# Patient Record
Sex: MALE | Race: BLACK OR AFRICAN AMERICAN | NOT HISPANIC OR LATINO | Employment: FULL TIME | RURAL
[De-identification: names, ages, dates, MRNs, and addresses within clinical notes are randomized per-mention and may not be internally consistent; named-entity substitution may affect disease eponyms.]

---

## 2020-03-08 ENCOUNTER — HISTORICAL (OUTPATIENT)
Dept: ADMINISTRATIVE | Facility: HOSPITAL | Age: 65
End: 2020-03-08

## 2020-03-08 LAB
BASOPHILS # BLD AUTO: 0.05 X10E3/UL (ref 0–0.2)
BASOPHILS NFR BLD AUTO: 0.4 % (ref 0–1)
BUN SERPL-MCNC: 17 MG/DL (ref 7–18)
CALCIUM SERPL-MCNC: 9.5 MG/DL (ref 8.5–10.1)
CHLORIDE SERPL-SCNC: 99 MMOL/L (ref 98–107)
CO2 SERPL-SCNC: 29 MMOL/L (ref 21–32)
CREAT SERPL-MCNC: 1.37 MG/DL (ref 0.7–1.3)
EOSINOPHIL # BLD AUTO: 0.1 X10E3/UL (ref 0–0.5)
EOSINOPHIL NFR BLD AUTO: 0.9 % (ref 1–4)
ERYTHROCYTE [DISTWIDTH] IN BLOOD BY AUTOMATED COUNT: 11.9 % (ref 11.5–14.5)
GLUCOSE SERPL-MCNC: 203 MG/DL (ref 74–106)
GLUCOSE SERPL-MCNC: 238 MG/DL (ref 70–105)
HCT VFR BLD AUTO: 51.3 % (ref 40–54)
HGB BLD-MCNC: 16 G/DL (ref 13.5–18)
IMM GRANULOCYTES # BLD AUTO: 0.06 X10E3/UL (ref 0–0.04)
IMM GRANULOCYTES NFR BLD: 0.5 % (ref 0–0.4)
LYMPHOCYTES # BLD AUTO: 1.48 X10E3/UL (ref 1–4.8)
LYMPHOCYTES NFR BLD AUTO: 13.1 % (ref 27–41)
MCH RBC QN AUTO: 31.1 PG (ref 27–31)
MCHC RBC AUTO-ENTMCNC: 31.2 G/DL (ref 32–36)
MCV RBC AUTO: 99.6 FL (ref 80–96)
MONOCYTES # BLD AUTO: 0.96 X10E3/UL (ref 0–0.8)
MONOCYTES NFR BLD AUTO: 8.5 % (ref 2–6)
MPC BLD CALC-MCNC: 10.5 FL (ref 9.4–12.4)
NEUTROPHILS # BLD AUTO: 8.63 X10E3/UL (ref 1.8–7.7)
NEUTROPHILS NFR BLD AUTO: 76.6 % (ref 53–65)
NRBC # BLD AUTO: 0 X10E3/UL (ref 0–0)
NRBC, AUTO (.00): 0 /100 (ref 0–0)
PLATELET # BLD AUTO: 256 X10E3/UL (ref 150–400)
POTASSIUM SERPL-SCNC: 4.4 MMOL/L (ref 3.5–5.1)
RBC # BLD AUTO: 5.15 X10E6/UL (ref 4.6–6.2)
SODIUM SERPL-SCNC: 136 MMOL/L (ref 136–145)
WBC # BLD AUTO: 11.28 X10E3/UL (ref 4.5–11)

## 2020-03-13 ENCOUNTER — HISTORICAL (OUTPATIENT)
Dept: ADMINISTRATIVE | Facility: HOSPITAL | Age: 65
End: 2020-03-13

## 2020-03-13 LAB
BASOPHILS # BLD AUTO: 0.04 X10E3/UL (ref 0–0.2)
BASOPHILS NFR BLD AUTO: 0.4 % (ref 0–1)
BUN SERPL-MCNC: 20 MG/DL (ref 7–18)
CALCIUM SERPL-MCNC: 9.1 MG/DL (ref 8.5–10.1)
CHLORIDE SERPL-SCNC: 93 MMOL/L (ref 98–107)
CO2 SERPL-SCNC: 26 MMOL/L (ref 21–32)
CREAT SERPL-MCNC: 1.66 MG/DL (ref 0.7–1.3)
EOSINOPHIL # BLD AUTO: 0.01 X10E3/UL (ref 0–0.5)
EOSINOPHIL NFR BLD AUTO: 0.1 % (ref 1–4)
ERYTHROCYTE [DISTWIDTH] IN BLOOD BY AUTOMATED COUNT: 11.9 % (ref 11.5–14.5)
GLUCOSE SERPL-MCNC: 265 MG/DL (ref 74–106)
HCT VFR BLD AUTO: 47.9 % (ref 40–54)
HGB BLD-MCNC: 15.3 G/DL (ref 13.5–18)
IMM GRANULOCYTES # BLD AUTO: 0.08 X10E3/UL (ref 0–0.04)
IMM GRANULOCYTES NFR BLD: 0.8 % (ref 0–0.4)
LYMPHOCYTES # BLD AUTO: 0.64 X10E3/UL (ref 1–4.8)
LYMPHOCYTES NFR BLD AUTO: 6.8 % (ref 27–41)
LYMPHOCYTES NFR BLD MANUAL: 12 % (ref 27–41)
MCH RBC QN AUTO: 30.9 PG (ref 27–31)
MCHC RBC AUTO-ENTMCNC: 31.9 G/DL (ref 32–36)
MCV RBC AUTO: 96.8 FL (ref 80–96)
METAMYELOCYTES NFR BLD MANUAL: 1 %
MONOCYTES # BLD AUTO: 0.75 X10E3/UL (ref 0–0.8)
MONOCYTES NFR BLD AUTO: 7.9 % (ref 2–6)
MONOCYTES NFR BLD MANUAL: 7 % (ref 2–6)
MPC BLD CALC-MCNC: 10 FL (ref 9.4–12.4)
MYELOCYTES NFR BLD MANUAL: 1 %
NEUTROPHILS # BLD AUTO: 7.92 X10E3/UL (ref 1.8–7.7)
NEUTROPHILS NFR BLD AUTO: 84 % (ref 53–65)
NEUTS SEG NFR BLD MANUAL: 79 % (ref 50–62)
NRBC # BLD AUTO: 0 X10E3/UL (ref 0–0)
NRBC, AUTO (.00): 0 /100 (ref 0–0)
PLATELET # BLD AUTO: 239 X10E3/UL (ref 150–400)
PLATELET MORPHOLOGY: NORMAL
POTASSIUM SERPL-SCNC: 4.7 MMOL/L (ref 3.5–5.1)
RBC # BLD AUTO: 4.95 X10E6/UL (ref 4.6–6.2)
RBC MORPH BLD: NORMAL
SODIUM SERPL-SCNC: 129 MMOL/L (ref 136–145)
TOXIC GRANULES BLD QL SMEAR: ABNORMAL
WBC # BLD AUTO: 9.44 X10E3/UL (ref 4.5–11)
WBC TOXIC VACUOLES BLD QL SMEAR: ABNORMAL

## 2020-03-14 LAB
REPORT: NORMAL

## 2020-03-21 ENCOUNTER — HISTORICAL (OUTPATIENT)
Dept: ADMINISTRATIVE | Facility: HOSPITAL | Age: 65
End: 2020-03-21

## 2020-03-21 LAB
ALBUMIN SERPL BCP-MCNC: 2.7 G/DL (ref 3.5–5)
ALBUMIN/GLOB SERPL: 0.5 {RATIO}
ALP SERPL-CCNC: 93 U/L (ref 45–115)
ALT SERPL W P-5'-P-CCNC: 24 U/L (ref 16–61)
AST SERPL W P-5'-P-CCNC: 11 U/L (ref 15–37)
BASOPHILS # BLD AUTO: 0.04 X10E3/UL (ref 0–0.2)
BASOPHILS NFR BLD AUTO: 0.3 % (ref 0–1)
BILIRUB SERPL-MCNC: 0.9 MG/DL (ref 0–1.2)
BUN SERPL-MCNC: 15 MG/DL (ref 7–18)
BUN/CREAT SERPL: 11.5
CALCIUM SERPL-MCNC: 9.3 MG/DL (ref 8.5–10.1)
CHLORIDE SERPL-SCNC: 96 MMOL/L (ref 98–107)
CO2 SERPL-SCNC: 30 MMOL/L (ref 21–32)
CREAT SERPL-MCNC: 1.31 MG/DL (ref 0.7–1.3)
EOSINOPHIL # BLD AUTO: 0.03 X10E3/UL (ref 0–0.5)
EOSINOPHIL NFR BLD AUTO: 0.2 % (ref 1–4)
ERYTHROCYTE [DISTWIDTH] IN BLOOD BY AUTOMATED COUNT: 11.8 % (ref 11.5–14.5)
GLOBULIN SER-MCNC: 5 G/DL (ref 2–4)
GLUCOSE SERPL-MCNC: 149 MG/DL (ref 74–106)
GLUCOSE SERPL-MCNC: 206 MG/DL (ref 70–105)
HCT VFR BLD AUTO: 41.3 % (ref 40–54)
HGB BLD-MCNC: 12.8 G/DL (ref 13.5–18)
IMM GRANULOCYTES # BLD AUTO: 0.17 X10E3/UL (ref 0–0.04)
IMM GRANULOCYTES NFR BLD: 1.1 % (ref 0–0.4)
LACTATE SERPL-SCNC: 1.5 MMOL/L (ref 0.4–2)
LYMPHOCYTES # BLD AUTO: 2.17 X10E3/UL (ref 1–4.8)
LYMPHOCYTES NFR BLD AUTO: 14.4 % (ref 27–41)
MCH RBC QN AUTO: 30.3 PG (ref 27–31)
MCHC RBC AUTO-ENTMCNC: 31 G/DL (ref 32–36)
MCV RBC AUTO: 97.6 FL (ref 80–96)
MONOCYTES # BLD AUTO: 1.35 X10E3/UL (ref 0–0.8)
MONOCYTES NFR BLD AUTO: 9 % (ref 2–6)
MPC BLD CALC-MCNC: 9.6 FL (ref 9.4–12.4)
NEUTROPHILS # BLD AUTO: 11.27 X10E3/UL (ref 1.8–7.7)
NEUTROPHILS NFR BLD AUTO: 75 % (ref 53–65)
NRBC # BLD AUTO: 0 X10E3/UL (ref 0–0)
NRBC, AUTO (.00): 0 /100 (ref 0–0)
PLATELET # BLD AUTO: 479 X10E3/UL (ref 150–400)
POTASSIUM SERPL-SCNC: 4.6 MMOL/L (ref 3.5–5.1)
PROT SERPL-MCNC: 7.7 G/DL (ref 6.4–8.2)
RBC # BLD AUTO: 4.23 X10E6/UL (ref 4.6–6.2)
SODIUM SERPL-SCNC: 132 MMOL/L (ref 136–145)
WBC # BLD AUTO: 15.03 X10E3/UL (ref 4.5–11)

## 2020-03-22 ENCOUNTER — HISTORICAL (OUTPATIENT)
Dept: ADMINISTRATIVE | Facility: HOSPITAL | Age: 65
End: 2020-03-22

## 2020-03-22 LAB
GLUCOSE SERPL-MCNC: 174 MG/DL (ref 70–105)
GLUCOSE SERPL-MCNC: 218 MG/DL (ref 70–105)
GLUCOSE SERPL-MCNC: 250 MG/DL (ref 70–105)

## 2020-03-23 ENCOUNTER — HISTORICAL (OUTPATIENT)
Dept: ADMINISTRATIVE | Facility: HOSPITAL | Age: 65
End: 2020-03-23

## 2020-03-23 LAB
BASOPHILS # BLD AUTO: 0.04 X10E3/UL (ref 0–0.2)
BASOPHILS NFR BLD AUTO: 0.3 % (ref 0–1)
BUN SERPL-MCNC: 9 MG/DL (ref 7–18)
CALCIUM SERPL-MCNC: 9 MG/DL (ref 8.5–10.1)
CHLORIDE SERPL-SCNC: 101 MMOL/L (ref 98–107)
CO2 SERPL-SCNC: 28 MMOL/L (ref 21–32)
CREAT SERPL-MCNC: 1.35 MG/DL (ref 0.7–1.3)
EOSINOPHIL # BLD AUTO: 0.05 X10E3/UL (ref 0–0.5)
EOSINOPHIL NFR BLD AUTO: 0.4 % (ref 1–4)
ERYTHROCYTE [DISTWIDTH] IN BLOOD BY AUTOMATED COUNT: 11.7 % (ref 11.5–14.5)
GLUCOSE SERPL-MCNC: 109 MG/DL (ref 70–105)
GLUCOSE SERPL-MCNC: 115 MG/DL (ref 74–106)
GLUCOSE SERPL-MCNC: 128 MG/DL (ref 70–105)
GLUCOSE SERPL-MCNC: 181 MG/DL (ref 70–105)
GLUCOSE SERPL-MCNC: 231 MG/DL (ref 70–105)
HCT VFR BLD AUTO: 36.6 % (ref 40–54)
HGB BLD-MCNC: 11.3 G/DL (ref 13.5–18)
IMM GRANULOCYTES # BLD AUTO: 0.1 X10E3/UL (ref 0–0.04)
IMM GRANULOCYTES NFR BLD: 0.8 % (ref 0–0.4)
LYMPHOCYTES # BLD AUTO: 2.16 X10E3/UL (ref 1–4.8)
LYMPHOCYTES NFR BLD AUTO: 17.6 % (ref 27–41)
MCH RBC QN AUTO: 30.5 PG (ref 27–31)
MCHC RBC AUTO-ENTMCNC: 30.9 G/DL (ref 32–36)
MCV RBC AUTO: 98.9 FL (ref 80–96)
MONOCYTES # BLD AUTO: 1.22 X10E3/UL (ref 0–0.8)
MONOCYTES NFR BLD AUTO: 9.9 % (ref 2–6)
MPC BLD CALC-MCNC: 9.7 FL (ref 9.4–12.4)
NEUTROPHILS # BLD AUTO: 8.7 X10E3/UL (ref 1.8–7.7)
NEUTROPHILS NFR BLD AUTO: 71 % (ref 53–65)
NRBC # BLD AUTO: 0 X10E3/UL (ref 0–0)
NRBC, AUTO (.00): 0 /100 (ref 0–0)
PLATELET # BLD AUTO: 411 X10E3/UL (ref 150–400)
POTASSIUM SERPL-SCNC: 4.3 MMOL/L (ref 3.5–5.1)
RBC # BLD AUTO: 3.7 X10E6/UL (ref 4.6–6.2)
SODIUM SERPL-SCNC: 136 MMOL/L (ref 136–145)
WBC # BLD AUTO: 12.27 X10E3/UL (ref 4.5–11)

## 2020-03-24 ENCOUNTER — HISTORICAL (OUTPATIENT)
Dept: ADMINISTRATIVE | Facility: HOSPITAL | Age: 65
End: 2020-03-24

## 2020-03-24 LAB
ABO: NORMAL
ANTIBODY SCREEN: NORMAL
BASOPHILS # BLD AUTO: 0.04 X10E3/UL (ref 0–0.2)
BASOPHILS NFR BLD AUTO: 0.3 % (ref 0–1)
BUN SERPL-MCNC: 10 MG/DL (ref 7–18)
C COLI+JEJ+UPSA DNA STL QL NAA+NON-PROBE: NEGATIVE
CALCIUM SERPL-MCNC: 9.7 MG/DL (ref 8.5–10.1)
CHLORIDE SERPL-SCNC: 99 MMOL/L (ref 98–107)
CO2 SERPL-SCNC: 25 MMOL/L (ref 21–32)
CREAT SERPL-MCNC: 1.22 MG/DL (ref 0.7–1.3)
E COLI SXT1 STL QL IA: NEGATIVE
E COLI SXT2 STL QL IA: NEGATIVE
EOSINOPHIL # BLD AUTO: 0.03 X10E3/UL (ref 0–0.5)
EOSINOPHIL NFR BLD AUTO: 0.3 % (ref 1–4)
ERYTHROCYTE [DISTWIDTH] IN BLOOD BY AUTOMATED COUNT: 11.9 % (ref 11.5–14.5)
GLUCOSE SERPL-MCNC: 116 MG/DL (ref 70–105)
GLUCOSE SERPL-MCNC: 135 MG/DL (ref 70–105)
GLUCOSE SERPL-MCNC: 139 MG/DL (ref 74–106)
GLUCOSE SERPL-MCNC: 141 MG/DL (ref 70–105)
GLUCOSE SERPL-MCNC: 148 MG/DL (ref 70–105)
GLUCOSE SERPL-MCNC: 213 MG/DL (ref 70–105)
HCT VFR BLD AUTO: 37.4 % (ref 40–54)
HGB BLD-MCNC: 11.9 G/DL (ref 13.5–18)
IMM GRANULOCYTES # BLD AUTO: 0.08 X10E3/UL (ref 0–0.04)
IMM GRANULOCYTES NFR BLD: 0.7 % (ref 0–0.4)
LYMPHOCYTES # BLD AUTO: 2.14 X10E3/UL (ref 1–4.8)
LYMPHOCYTES NFR BLD AUTO: 18.2 % (ref 27–41)
MCH RBC QN AUTO: 30.7 PG (ref 27–31)
MCHC RBC AUTO-ENTMCNC: 31.8 G/DL (ref 32–36)
MCV RBC AUTO: 96.4 FL (ref 80–96)
MONOCYTES # BLD AUTO: 1.1 X10E3/UL (ref 0–0.8)
MONOCYTES NFR BLD AUTO: 9.4 % (ref 2–6)
MPC BLD CALC-MCNC: 9.7 FL (ref 9.4–12.4)
NEUTROPHILS # BLD AUTO: 8.35 X10E3/UL (ref 1.8–7.7)
NEUTROPHILS NFR BLD AUTO: 71.1 % (ref 53–65)
NOROVIRUS GI+II RNA STL QL NAA+NON-PROBE: NEGATIVE
NRBC # BLD AUTO: 0 X10E3/UL (ref 0–0)
NRBC, AUTO (.00): 0 /100 (ref 0–0)
PLATELET # BLD AUTO: 418 X10E3/UL (ref 150–400)
POTASSIUM SERPL-SCNC: 4.1 MMOL/L (ref 3.5–5.1)
RBC # BLD AUTO: 3.88 X10E6/UL (ref 4.6–6.2)
RH TYPE: NORMAL
RVA RNA STL QL NAA+NON-PROBE: NEGATIVE
S ENT+BONG DNA STL QL NAA+NON-PROBE: NEGATIVE
SHIGELLA SPECIES NAT: NEGATIVE
SODIUM SERPL-SCNC: 133 MMOL/L (ref 136–145)
V CHOL+PARA+VUL DNA STL QL NAA+NON-PROBE: NEGATIVE
WBC # BLD AUTO: 11.74 X10E3/UL (ref 4.5–11)
Y ENTEROCOL DNA STL QL NAA+NON-PROBE: NEGATIVE

## 2020-03-25 ENCOUNTER — HISTORICAL (OUTPATIENT)
Dept: ADMINISTRATIVE | Facility: HOSPITAL | Age: 65
End: 2020-03-25

## 2020-03-25 LAB
BUN SERPL-MCNC: 11 MG/DL (ref 7–18)
C DIFF TOX A+B STL IA-ACNC: NEGATIVE
CALCIUM SERPL-MCNC: 9.2 MG/DL (ref 8.5–10.1)
CHLORIDE SERPL-SCNC: 101 MMOL/L (ref 98–107)
CO2 SERPL-SCNC: 25 MMOL/L (ref 21–32)
CREAT SERPL-MCNC: 1.19 MG/DL (ref 0.7–1.3)
GLUCOSE SERPL-MCNC: 115 MG/DL (ref 70–105)
GLUCOSE SERPL-MCNC: 143 MG/DL (ref 74–106)
GLUCOSE SERPL-MCNC: 144 MG/DL (ref 70–105)
GLUCOSE SERPL-MCNC: 178 MG/DL (ref 70–105)
GLUCOSE SERPL-MCNC: 210 MG/DL (ref 70–105)
HCT VFR BLD AUTO: 36.8 % (ref 40–54)
POTASSIUM SERPL-SCNC: 4.3 MMOL/L (ref 3.5–5.1)
REPORT: 38
REPORT: NORMAL
SODIUM SERPL-SCNC: 133 MMOL/L (ref 136–145)

## 2020-03-26 ENCOUNTER — HISTORICAL (OUTPATIENT)
Dept: ADMINISTRATIVE | Facility: HOSPITAL | Age: 65
End: 2020-03-26

## 2020-03-26 LAB
GLUCOSE SERPL-MCNC: 148 MG/DL (ref 70–105)
GLUCOSE SERPL-MCNC: 153 MG/DL (ref 70–105)
GLUCOSE SERPL-MCNC: 155 MG/DL (ref 70–105)
GLUCOSE SERPL-MCNC: 163 MG/DL (ref 70–105)
INSULIN SERPL-ACNC: NORMAL U[IU]/ML
INSULIN SERPL-ACNC: YELLOW U[IU]/ML
LAB AP CLINICAL INFORMATION: NORMAL
LAB AP DIAGNOSIS - HISTORICAL: NORMAL
LAB AP GROSS PATHOLOGY - HISTORICAL: NORMAL
LAB AP SPECIMEN SUBMITTED - HISTORICAL: NORMAL
REPORT: NORMAL

## 2020-03-27 ENCOUNTER — HISTORICAL (OUTPATIENT)
Dept: ADMINISTRATIVE | Facility: HOSPITAL | Age: 65
End: 2020-03-27

## 2020-03-27 LAB
ABO: NORMAL
ANTIBODY SCREEN: NORMAL
BASOPHILS # BLD AUTO: 0.04 X10E3/UL (ref 0–0.2)
BASOPHILS NFR BLD AUTO: 0.4 % (ref 0–1)
BUN SERPL-MCNC: 12 MG/DL (ref 7–18)
CALCIUM SERPL-MCNC: 8.8 MG/DL (ref 8.5–10.1)
CHLORIDE SERPL-SCNC: 102 MMOL/L (ref 98–107)
CO2 SERPL-SCNC: 26 MMOL/L (ref 21–32)
CREAT SERPL-MCNC: 1.15 MG/DL (ref 0.7–1.3)
EOSINOPHIL # BLD AUTO: 0.16 X10E3/UL (ref 0–0.5)
EOSINOPHIL NFR BLD AUTO: 1.5 % (ref 1–4)
ERYTHROCYTE [DISTWIDTH] IN BLOOD BY AUTOMATED COUNT: 11.8 % (ref 11.5–14.5)
GLUCOSE SERPL-MCNC: 116 MG/DL (ref 70–105)
GLUCOSE SERPL-MCNC: 135 MG/DL (ref 74–106)
GLUCOSE SERPL-MCNC: 141 MG/DL (ref 70–105)
GLUCOSE SERPL-MCNC: 246 MG/DL (ref 70–105)
HCT VFR BLD AUTO: 32.1 % (ref 40–54)
HGB BLD-MCNC: 10.2 G/DL (ref 13.5–18)
IMM GRANULOCYTES # BLD AUTO: 0.05 X10E3/UL (ref 0–0.04)
IMM GRANULOCYTES NFR BLD: 0.5 % (ref 0–0.4)
LYMPHOCYTES # BLD AUTO: 2.01 X10E3/UL (ref 1–4.8)
LYMPHOCYTES NFR BLD AUTO: 19.3 % (ref 27–41)
MCH RBC QN AUTO: 30.9 PG (ref 27–31)
MCHC RBC AUTO-ENTMCNC: 31.8 G/DL (ref 32–36)
MCV RBC AUTO: 97.3 FL (ref 80–96)
MONOCYTES # BLD AUTO: 0.95 X10E3/UL (ref 0–0.8)
MONOCYTES NFR BLD AUTO: 9.1 % (ref 2–6)
MPC BLD CALC-MCNC: 9.7 FL (ref 9.4–12.4)
NEUTROPHILS # BLD AUTO: 7.19 X10E3/UL (ref 1.8–7.7)
NEUTROPHILS NFR BLD AUTO: 69.2 % (ref 53–65)
NRBC # BLD AUTO: 0 X10E3/UL (ref 0–0)
NRBC, AUTO (.00): 0 /100 (ref 0–0)
PLATELET # BLD AUTO: 367 X10E3/UL (ref 150–400)
POTASSIUM SERPL-SCNC: 4 MMOL/L (ref 3.5–5.1)
RBC # BLD AUTO: 3.3 X10E6/UL (ref 4.6–6.2)
REPORT: NORMAL
RH TYPE: NORMAL
SODIUM SERPL-SCNC: 136 MMOL/L (ref 136–145)
WBC # BLD AUTO: 10.4 X10E3/UL (ref 4.5–11)

## 2020-03-28 ENCOUNTER — HISTORICAL (OUTPATIENT)
Dept: ADMINISTRATIVE | Facility: HOSPITAL | Age: 65
End: 2020-03-28

## 2020-03-28 LAB
CHOLEST SERPL-MCNC: 102 MG/DL
GLUCOSE SERPL-MCNC: 125 MG/DL (ref 70–105)
GLUCOSE SERPL-MCNC: 140 MG/DL (ref 70–105)
GLUCOSE SERPL-MCNC: 182 MG/DL (ref 70–105)
GLUCOSE SERPL-MCNC: 244 MG/DL (ref 70–105)
HCT VFR BLD AUTO: 33.4 % (ref 40–54)
HDLC SERPL-MCNC: 30 MG/DL
LDLC SERPL CALC-MCNC: 55 MG/DL
TRIGL SERPL-MCNC: 83 MG/DL
VLDLC SERPL-MCNC: 17 MG/DL

## 2020-03-29 ENCOUNTER — HISTORICAL (OUTPATIENT)
Dept: ADMINISTRATIVE | Facility: HOSPITAL | Age: 65
End: 2020-03-29

## 2020-03-29 LAB
GLUCOSE SERPL-MCNC: 141 MG/DL (ref 70–105)
GLUCOSE SERPL-MCNC: 149 MG/DL (ref 70–105)
GLUCOSE SERPL-MCNC: 155 MG/DL (ref 70–105)
GLUCOSE SERPL-MCNC: 222 MG/DL (ref 70–105)

## 2020-03-30 ENCOUNTER — HISTORICAL (OUTPATIENT)
Dept: ADMINISTRATIVE | Facility: HOSPITAL | Age: 65
End: 2020-03-30

## 2020-03-30 LAB
GLUCOSE SERPL-MCNC: 108 MG/DL (ref 70–105)
GLUCOSE SERPL-MCNC: 131 MG/DL (ref 70–105)
GLUCOSE SERPL-MCNC: 133 MG/DL (ref 70–105)
GLUCOSE SERPL-MCNC: 133 MG/DL (ref 70–105)
GLUCOSE SERPL-MCNC: 166 MG/DL (ref 70–105)

## 2020-03-31 ENCOUNTER — HISTORICAL (OUTPATIENT)
Dept: ADMINISTRATIVE | Facility: HOSPITAL | Age: 65
End: 2020-03-31

## 2020-03-31 LAB
GLUCOSE SERPL-MCNC: 125 MG/DL (ref 70–105)
GLUCOSE SERPL-MCNC: 151 MG/DL (ref 70–105)
GLUCOSE SERPL-MCNC: 168 MG/DL (ref 70–105)
GLUCOSE SERPL-MCNC: 203 MG/DL (ref 70–105)

## 2020-04-01 ENCOUNTER — HISTORICAL (OUTPATIENT)
Dept: ADMINISTRATIVE | Facility: HOSPITAL | Age: 65
End: 2020-04-01

## 2020-04-01 LAB
BASOPHILS # BLD AUTO: 0.03 X10E3/UL (ref 0–0.2)
BASOPHILS NFR BLD AUTO: 0.3 % (ref 0–1)
BUN SERPL-MCNC: 8 MG/DL (ref 7–18)
CALCIUM SERPL-MCNC: 8.3 MG/DL (ref 8.5–10.1)
CHLORIDE SERPL-SCNC: 103 MMOL/L (ref 98–107)
CO2 SERPL-SCNC: 26 MMOL/L (ref 21–32)
CREAT SERPL-MCNC: 0.91 MG/DL (ref 0.7–1.3)
EOSINOPHIL # BLD AUTO: 0.11 X10E3/UL (ref 0–0.5)
EOSINOPHIL NFR BLD AUTO: 1.3 % (ref 1–4)
ERYTHROCYTE [DISTWIDTH] IN BLOOD BY AUTOMATED COUNT: 12.4 % (ref 11.5–14.5)
GLUCOSE SERPL-MCNC: 107 MG/DL (ref 70–105)
GLUCOSE SERPL-MCNC: 128 MG/DL (ref 74–106)
GLUCOSE SERPL-MCNC: 146 MG/DL (ref 70–105)
GLUCOSE SERPL-MCNC: 177 MG/DL (ref 70–105)
GLUCOSE SERPL-MCNC: 242 MG/DL (ref 70–105)
GLUCOSE SERPL-MCNC: 261 MG/DL (ref 70–105)
HCT VFR BLD AUTO: 28.4 % (ref 40–54)
HGB BLD-MCNC: 8.6 G/DL (ref 13.5–18)
IMM GRANULOCYTES # BLD AUTO: 0.05 X10E3/UL (ref 0–0.04)
IMM GRANULOCYTES NFR BLD: 0.6 % (ref 0–0.4)
LYMPHOCYTES # BLD AUTO: 1.64 X10E3/UL (ref 1–4.8)
LYMPHOCYTES NFR BLD AUTO: 18.8 % (ref 27–41)
MCH RBC QN AUTO: 30.5 PG (ref 27–31)
MCHC RBC AUTO-ENTMCNC: 30.3 G/DL (ref 32–36)
MCV RBC AUTO: 100.7 FL (ref 80–96)
MONOCYTES # BLD AUTO: 0.96 X10E3/UL (ref 0–0.8)
MONOCYTES NFR BLD AUTO: 11 % (ref 2–6)
MPC BLD CALC-MCNC: 10.1 FL (ref 9.4–12.4)
NEUTROPHILS # BLD AUTO: 5.92 X10E3/UL (ref 1.8–7.7)
NEUTROPHILS NFR BLD AUTO: 68 % (ref 53–65)
NRBC # BLD AUTO: 0 X10E3/UL (ref 0–0)
NRBC, AUTO (.00): 0 /100 (ref 0–0)
PLATELET # BLD AUTO: 310 X10E3/UL (ref 150–400)
POTASSIUM SERPL-SCNC: 4.1 MMOL/L (ref 3.5–5.1)
RBC # BLD AUTO: 2.82 X10E6/UL (ref 4.6–6.2)
SODIUM SERPL-SCNC: 137 MMOL/L (ref 136–145)
WBC # BLD AUTO: 8.71 X10E3/UL (ref 4.5–11)

## 2020-04-02 ENCOUNTER — HISTORICAL (OUTPATIENT)
Dept: ADMINISTRATIVE | Facility: HOSPITAL | Age: 65
End: 2020-04-02

## 2020-04-02 LAB
GLUCOSE SERPL-MCNC: 141 MG/DL (ref 70–105)
GLUCOSE SERPL-MCNC: 178 MG/DL (ref 70–105)
GLUCOSE SERPL-MCNC: 181 MG/DL (ref 70–105)
GLUCOSE SERPL-MCNC: 182 MG/DL (ref 70–105)
GLUCOSE SERPL-MCNC: 189 MG/DL (ref 70–105)

## 2020-04-03 ENCOUNTER — HISTORICAL (OUTPATIENT)
Dept: ADMINISTRATIVE | Facility: HOSPITAL | Age: 65
End: 2020-04-03

## 2020-04-03 LAB
GLUCOSE SERPL-MCNC: 129 MG/DL (ref 70–105)
GLUCOSE SERPL-MCNC: 131 MG/DL (ref 70–105)
GLUCOSE SERPL-MCNC: 137 MG/DL (ref 70–105)
GLUCOSE SERPL-MCNC: 139 MG/DL (ref 70–105)
GLUCOSE SERPL-MCNC: 182 MG/DL (ref 70–105)
REPORT: NORMAL

## 2020-04-04 ENCOUNTER — HISTORICAL (OUTPATIENT)
Dept: ADMINISTRATIVE | Facility: HOSPITAL | Age: 65
End: 2020-04-04

## 2020-04-04 LAB
GLUCOSE SERPL-MCNC: 120 MG/DL (ref 70–105)
GLUCOSE SERPL-MCNC: 159 MG/DL (ref 70–105)
GLUCOSE SERPL-MCNC: 166 MG/DL (ref 70–105)
GLUCOSE SERPL-MCNC: 233 MG/DL (ref 70–105)
REPORT: NORMAL

## 2020-04-05 ENCOUNTER — HISTORICAL (OUTPATIENT)
Dept: ADMINISTRATIVE | Facility: HOSPITAL | Age: 65
End: 2020-04-05

## 2020-04-05 LAB
C DIFF TOX A+B STL IA-ACNC: NEGATIVE
GLUCOSE SERPL-MCNC: 140 MG/DL (ref 70–105)
GLUCOSE SERPL-MCNC: 148 MG/DL (ref 70–105)

## 2020-04-08 LAB

## 2020-04-17 ENCOUNTER — HISTORICAL (OUTPATIENT)
Dept: ADMINISTRATIVE | Facility: HOSPITAL | Age: 65
End: 2020-04-17

## 2020-05-06 ENCOUNTER — HISTORICAL (OUTPATIENT)
Dept: ADMINISTRATIVE | Facility: HOSPITAL | Age: 65
End: 2020-05-06

## 2020-05-06 LAB
BASOPHILS # BLD AUTO: 0.03 X10E3/UL (ref 0–0.2)
BASOPHILS NFR BLD AUTO: 0.4 % (ref 0–1)
EOSINOPHIL # BLD AUTO: 0.13 X10E3/UL (ref 0–0.5)
EOSINOPHIL NFR BLD AUTO: 1.6 % (ref 1–4)
ERYTHROCYTE [DISTWIDTH] IN BLOOD BY AUTOMATED COUNT: 14.3 % (ref 11.5–14.5)
GLUCOSE SERPL-MCNC: 110 MG/DL (ref 70–105)
GLUCOSE SERPL-MCNC: 179 MG/DL (ref 70–105)
HCT VFR BLD AUTO: 43.1 % (ref 40–54)
HGB BLD-MCNC: 13 G/DL (ref 13.5–18)
IMM GRANULOCYTES # BLD AUTO: 0.03 X10E3/UL (ref 0–0.04)
IMM GRANULOCYTES NFR BLD: 0.4 % (ref 0–0.4)
LYMPHOCYTES # BLD AUTO: 1.82 X10E3/UL (ref 1–4.8)
LYMPHOCYTES NFR BLD AUTO: 21.7 % (ref 27–41)
MCH RBC QN AUTO: 30.8 PG (ref 27–31)
MCHC RBC AUTO-ENTMCNC: 30.2 G/DL (ref 32–36)
MCV RBC AUTO: 102.1 FL (ref 80–96)
MONOCYTES # BLD AUTO: 0.85 X10E3/UL (ref 0–0.8)
MONOCYTES NFR BLD AUTO: 10.2 % (ref 2–6)
MPC BLD CALC-MCNC: 10.2 FL (ref 9.4–12.4)
NEUTROPHILS # BLD AUTO: 5.51 X10E3/UL (ref 1.8–7.7)
NEUTROPHILS NFR BLD AUTO: 65.7 % (ref 53–65)
NRBC # BLD AUTO: 0 X10E3/UL (ref 0–0)
NRBC, AUTO (.00): 0 /100 (ref 0–0)
PLATELET # BLD AUTO: 205 X10E3/UL (ref 150–400)
RBC # BLD AUTO: 4.22 X10E6/UL (ref 4.6–6.2)
WBC # BLD AUTO: 8.37 X10E3/UL (ref 4.5–11)

## 2020-05-09 LAB
REPORT: NORMAL

## 2020-05-11 LAB
REPORT: NORMAL

## 2020-08-18 ENCOUNTER — HISTORICAL (OUTPATIENT)
Dept: ADMINISTRATIVE | Facility: HOSPITAL | Age: 65
End: 2020-08-18

## 2020-09-02 ENCOUNTER — HISTORICAL (OUTPATIENT)
Dept: ADMINISTRATIVE | Facility: HOSPITAL | Age: 65
End: 2020-09-02

## 2021-04-08 DIAGNOSIS — Z12.11 SCREEN FOR COLON CANCER: Primary | ICD-10-CM

## 2021-05-04 ENCOUNTER — OFFICE VISIT (OUTPATIENT)
Dept: SURGERY | Facility: CLINIC | Age: 66
End: 2021-05-04
Payer: OTHER GOVERNMENT

## 2021-05-04 VITALS — BODY MASS INDEX: 27.97 KG/M2 | WEIGHT: 152 LBS | HEIGHT: 62 IN

## 2021-05-04 DIAGNOSIS — S98.111A AMPUTATION OF RIGHT GREAT TOE: ICD-10-CM

## 2021-05-04 PROCEDURE — 99213 HC OFFICE/OUTPT VISIT, EST, LEVL III, 20-29 MIN: ICD-10-PCS | Mod: PBBFAC,,, | Performed by: STUDENT IN AN ORGANIZED HEALTH CARE EDUCATION/TRAINING PROGRAM

## 2021-05-04 PROCEDURE — 99214 PR OFFICE/OUTPT VISIT, EST, LEVL IV, 30-39 MIN: ICD-10-PCS | Mod: S$PBB,,, | Performed by: STUDENT IN AN ORGANIZED HEALTH CARE EDUCATION/TRAINING PROGRAM

## 2021-05-04 PROCEDURE — 99213 OFFICE O/P EST LOW 20 MIN: CPT | Mod: PBBFAC,,, | Performed by: STUDENT IN AN ORGANIZED HEALTH CARE EDUCATION/TRAINING PROGRAM

## 2021-05-04 PROCEDURE — 99214 OFFICE O/P EST MOD 30 MIN: CPT | Mod: S$PBB,,, | Performed by: STUDENT IN AN ORGANIZED HEALTH CARE EDUCATION/TRAINING PROGRAM

## 2021-05-04 PROCEDURE — 99213 OFFICE O/P EST LOW 20 MIN: CPT | Mod: PBBFAC | Performed by: STUDENT IN AN ORGANIZED HEALTH CARE EDUCATION/TRAINING PROGRAM

## 2021-05-04 RX ORDER — METFORMIN HYDROCHLORIDE 500 MG/1
500 TABLET ORAL 2 TIMES DAILY WITH MEALS
COMMUNITY

## 2021-05-08 ENCOUNTER — HOSPITAL ENCOUNTER (EMERGENCY)
Facility: HOSPITAL | Age: 66
Discharge: HOME | End: 2021-05-08
Payer: OTHER GOVERNMENT

## 2021-05-08 VITALS
HEART RATE: 88 BPM | BODY MASS INDEX: 28.16 KG/M2 | OXYGEN SATURATION: 98 % | DIASTOLIC BLOOD PRESSURE: 85 MMHG | SYSTOLIC BLOOD PRESSURE: 170 MMHG | HEIGHT: 62 IN | WEIGHT: 153 LBS | RESPIRATION RATE: 16 BRPM | TEMPERATURE: 98 F

## 2021-05-08 DIAGNOSIS — S98.111A AMPUTATION OF RIGHT GREAT TOE: ICD-10-CM

## 2021-05-08 DIAGNOSIS — R25.2 MUSCLE CRAMPING: Primary | ICD-10-CM

## 2021-05-08 DIAGNOSIS — R73.9 HYPERGLYCEMIA: ICD-10-CM

## 2021-05-08 LAB
ANION GAP SERPL CALCULATED.3IONS-SCNC: 14 MMOL/L (ref 7–16)
BASOPHILS # BLD AUTO: 0.04 K/UL (ref 0–0.2)
BASOPHILS NFR BLD AUTO: 0.4 % (ref 0–1)
BUN SERPL-MCNC: 16 MG/DL (ref 7–18)
BUN/CREAT SERPL: 12 (ref 6–20)
CALCIUM SERPL-MCNC: 9 MG/DL (ref 8.5–10.1)
CHLORIDE SERPL-SCNC: 99 MMOL/L (ref 98–107)
CO2 SERPL-SCNC: 27 MMOL/L (ref 21–32)
CREAT SERPL-MCNC: 1.35 MG/DL (ref 0.7–1.3)
DIFFERENTIAL METHOD BLD: ABNORMAL
EOSINOPHIL # BLD AUTO: 0 K/UL (ref 0–0.5)
EOSINOPHIL NFR BLD AUTO: 0 % (ref 1–4)
ERYTHROCYTE [DISTWIDTH] IN BLOOD BY AUTOMATED COUNT: 11.9 % (ref 11.5–14.5)
GLUCOSE SERPL-MCNC: 325 MG/DL (ref 74–106)
HCT VFR BLD AUTO: 48.9 % (ref 40–54)
HGB BLD-MCNC: 16 G/DL (ref 13.5–18)
IMM GRANULOCYTES # BLD AUTO: 0.05 K/UL (ref 0–0.04)
IMM GRANULOCYTES NFR BLD: 0.5 % (ref 0–0.4)
LYMPHOCYTES # BLD AUTO: 0.84 K/UL (ref 1–4.8)
LYMPHOCYTES NFR BLD AUTO: 8.9 % (ref 27–41)
MAGNESIUM SERPL-MCNC: 1.7 MG/DL (ref 1.7–2.3)
MCH RBC QN AUTO: 31.9 PG (ref 27–31)
MCHC RBC AUTO-ENTMCNC: 32.7 G/DL (ref 32–36)
MCV RBC AUTO: 97.6 FL (ref 80–96)
MONOCYTES # BLD AUTO: 0.62 K/UL (ref 0–0.8)
MONOCYTES NFR BLD AUTO: 6.6 % (ref 2–6)
MPC BLD CALC-MCNC: 10.5 FL (ref 9.4–12.4)
NEUTROPHILS # BLD AUTO: 7.87 K/UL (ref 1.8–7.7)
NEUTROPHILS NFR BLD AUTO: 83.6 % (ref 53–65)
NRBC # BLD AUTO: 0 X10E3/UL
NRBC, AUTO (.00): 0 %
PLATELET # BLD AUTO: 148 K/UL (ref 150–400)
POTASSIUM SERPL-SCNC: 4.1 MMOL/L (ref 3.5–5.1)
RBC # BLD AUTO: 5.01 M/UL (ref 4.6–6.2)
SODIUM SERPL-SCNC: 136 MMOL/L (ref 136–145)
WBC # BLD AUTO: 9.42 K/UL (ref 4.5–11)

## 2021-05-08 PROCEDURE — 83735 ASSAY OF MAGNESIUM: CPT | Performed by: NURSE PRACTITIONER

## 2021-05-08 PROCEDURE — 99282 EMERGENCY DEPT VISIT SF MDM: CPT | Mod: ,,, | Performed by: NURSE PRACTITIONER

## 2021-05-08 PROCEDURE — 80048 BASIC METABOLIC PNL TOTAL CA: CPT | Performed by: NURSE PRACTITIONER

## 2021-05-08 PROCEDURE — 36415 COLL VENOUS BLD VENIPUNCTURE: CPT | Performed by: NURSE PRACTITIONER

## 2021-05-08 PROCEDURE — 99282 PR EMERGENCY DEPT VISIT,LEVEL II: ICD-10-PCS | Mod: ,,, | Performed by: NURSE PRACTITIONER

## 2021-05-08 PROCEDURE — 25000003 PHARM REV CODE 250: Performed by: NURSE PRACTITIONER

## 2021-05-08 PROCEDURE — 99282 EMERGENCY DEPT VISIT SF MDM: CPT

## 2021-05-08 PROCEDURE — 85025 COMPLETE CBC W/AUTO DIFF WBC: CPT | Performed by: NURSE PRACTITIONER

## 2021-05-08 RX ADMIN — SODIUM CHLORIDE 1000 ML: 9 INJECTION, SOLUTION INTRAVENOUS at 05:05

## 2021-05-12 ENCOUNTER — HOSPITAL ENCOUNTER (EMERGENCY)
Facility: HOSPITAL | Age: 66
Discharge: ANOTHER HOSPITAL | End: 2021-05-12
Payer: OTHER GOVERNMENT

## 2021-05-12 VITALS
HEIGHT: 62 IN | TEMPERATURE: 99 F | OXYGEN SATURATION: 98 % | SYSTOLIC BLOOD PRESSURE: 155 MMHG | WEIGHT: 152 LBS | RESPIRATION RATE: 16 BRPM | HEART RATE: 81 BPM | BODY MASS INDEX: 27.97 KG/M2 | DIASTOLIC BLOOD PRESSURE: 71 MMHG

## 2021-05-12 DIAGNOSIS — M25.559 HIP PAIN: ICD-10-CM

## 2021-05-12 DIAGNOSIS — M79.661 RIGHT CALF PAIN: ICD-10-CM

## 2021-05-12 DIAGNOSIS — I74.09 ACUTE OCCLUSION OF AORTOILIAC ARTERY: ICD-10-CM

## 2021-05-12 DIAGNOSIS — I99.8 ISCHEMIA: Primary | ICD-10-CM

## 2021-05-12 DIAGNOSIS — S98.111A AMPUTATION OF RIGHT GREAT TOE: ICD-10-CM

## 2021-05-12 LAB
ANION GAP SERPL CALCULATED.3IONS-SCNC: 14 MMOL/L (ref 7–16)
APTT PPP: 30.7 SECONDS (ref 25.2–37.3)
BASOPHILS # BLD AUTO: 0.03 K/UL (ref 0–0.2)
BASOPHILS NFR BLD AUTO: 0.5 % (ref 0–1)
BUN SERPL-MCNC: 14 MG/DL (ref 7–18)
BUN/CREAT SERPL: 12 (ref 6–20)
CALCIUM SERPL-MCNC: 8.7 MG/DL (ref 8.5–10.1)
CHLORIDE SERPL-SCNC: 102 MMOL/L (ref 98–107)
CO2 SERPL-SCNC: 23 MMOL/L (ref 21–32)
CREAT SERPL-MCNC: 1.19 MG/DL (ref 0.7–1.3)
DIFFERENTIAL METHOD BLD: ABNORMAL
EOSINOPHIL # BLD AUTO: 0.07 K/UL (ref 0–0.5)
EOSINOPHIL NFR BLD AUTO: 1.1 % (ref 1–4)
ERYTHROCYTE [DISTWIDTH] IN BLOOD BY AUTOMATED COUNT: 11.7 % (ref 11.5–14.5)
GLUCOSE SERPL-MCNC: 285 MG/DL (ref 74–106)
GLUCOSE SERPL-MCNC: 287 MG/DL (ref 70–105)
HCT VFR BLD AUTO: 47.8 % (ref 40–54)
HGB BLD-MCNC: 15.4 G/DL (ref 13.5–18)
IMM GRANULOCYTES # BLD AUTO: 0.03 K/UL (ref 0–0.04)
IMM GRANULOCYTES NFR BLD: 0.5 % (ref 0–0.4)
INR BLD: 0.95 (ref 0.9–1.1)
LYMPHOCYTES # BLD AUTO: 1.35 K/UL (ref 1–4.8)
LYMPHOCYTES NFR BLD AUTO: 22 % (ref 27–41)
MCH RBC QN AUTO: 32 PG (ref 27–31)
MCHC RBC AUTO-ENTMCNC: 32.2 G/DL (ref 32–36)
MCV RBC AUTO: 99.4 FL (ref 80–96)
MONOCYTES # BLD AUTO: 0.58 K/UL (ref 0–0.8)
MONOCYTES NFR BLD AUTO: 9.4 % (ref 2–6)
MPC BLD CALC-MCNC: 10.7 FL (ref 9.4–12.4)
NEUTROPHILS # BLD AUTO: 4.08 K/UL (ref 1.8–7.7)
NEUTROPHILS NFR BLD AUTO: 66.5 % (ref 53–65)
NRBC # BLD AUTO: 0 X10E3/UL
NRBC, AUTO (.00): 0 %
PLATELET # BLD AUTO: 199 K/UL (ref 150–400)
POTASSIUM SERPL-SCNC: 4.4 MMOL/L (ref 3.5–5.1)
PROTHROMBIN TIME: 12.2 SECONDS (ref 11.7–14.7)
RBC # BLD AUTO: 4.81 M/UL (ref 4.6–6.2)
SODIUM SERPL-SCNC: 135 MMOL/L (ref 136–145)
WBC # BLD AUTO: 6.14 K/UL (ref 4.5–11)

## 2021-05-12 PROCEDURE — 85730 THROMBOPLASTIN TIME PARTIAL: CPT | Performed by: NURSE PRACTITIONER

## 2021-05-12 PROCEDURE — 80048 BASIC METABOLIC PNL TOTAL CA: CPT | Performed by: NURSE PRACTITIONER

## 2021-05-12 PROCEDURE — 82962 GLUCOSE BLOOD TEST: CPT

## 2021-05-12 PROCEDURE — 25500020 PHARM REV CODE 255: Performed by: NURSE PRACTITIONER

## 2021-05-12 PROCEDURE — 63600175 PHARM REV CODE 636 W HCPCS: Performed by: NURSE PRACTITIONER

## 2021-05-12 PROCEDURE — 36415 COLL VENOUS BLD VENIPUNCTURE: CPT | Performed by: NURSE PRACTITIONER

## 2021-05-12 PROCEDURE — 99285 PR EMERGENCY DEPT VISIT,LEVEL V: ICD-10-PCS | Mod: ,,, | Performed by: SURGERY

## 2021-05-12 PROCEDURE — 99285 EMERGENCY DEPT VISIT HI MDM: CPT | Mod: 25

## 2021-05-12 PROCEDURE — 99284 EMERGENCY DEPT VISIT MOD MDM: CPT | Mod: ,,, | Performed by: NURSE PRACTITIONER

## 2021-05-12 PROCEDURE — 85025 COMPLETE CBC W/AUTO DIFF WBC: CPT | Performed by: NURSE PRACTITIONER

## 2021-05-12 PROCEDURE — 99285 EMERGENCY DEPT VISIT HI MDM: CPT | Mod: ,,, | Performed by: SURGERY

## 2021-05-12 PROCEDURE — 85610 PROTHROMBIN TIME: CPT | Performed by: NURSE PRACTITIONER

## 2021-05-12 PROCEDURE — 99284 PR EMERGENCY DEPT VISIT,LEVEL IV: ICD-10-PCS | Mod: ,,, | Performed by: NURSE PRACTITIONER

## 2021-05-12 PROCEDURE — 96374 THER/PROPH/DIAG INJ IV PUSH: CPT | Mod: 59

## 2021-05-12 RX ORDER — ERGOCALCIFEROL 1.25 MG/1
CAPSULE ORAL
COMMUNITY
Start: 2020-08-28

## 2021-05-12 RX ORDER — HEPARIN SODIUM,PORCINE/D5W 25000/250
0-40 INTRAVENOUS SOLUTION INTRAVENOUS CONTINUOUS
Status: DISCONTINUED | OUTPATIENT
Start: 2021-05-12 | End: 2021-05-12 | Stop reason: HOSPADM

## 2021-05-12 RX ORDER — SILDENAFIL 100 MG/1
TABLET, FILM COATED ORAL
COMMUNITY
Start: 2020-07-01

## 2021-05-12 RX ORDER — HEPARIN SODIUM 5000 [USP'U]/ML
2000 INJECTION, SOLUTION INTRAVENOUS; SUBCUTANEOUS
Status: COMPLETED | OUTPATIENT
Start: 2021-05-12 | End: 2021-05-12

## 2021-05-12 RX ADMIN — HEPARIN SODIUM 18 UNITS/KG/HR: 10000 INJECTION, SOLUTION INTRAVENOUS at 04:05

## 2021-05-12 RX ADMIN — HEPARIN SODIUM 2000 UNITS: 5000 INJECTION INTRAVENOUS; SUBCUTANEOUS at 04:05

## 2021-05-12 RX ADMIN — IOPAMIDOL 100 ML: 755 INJECTION, SOLUTION INTRAVENOUS at 02:05

## 2024-02-20 RX ORDER — ASPIRIN 81 MG/1
81 TABLET ORAL DAILY
COMMUNITY

## 2024-02-20 RX ORDER — ALOGLIPTIN 25 MG/1
25 TABLET, FILM COATED ORAL DAILY
COMMUNITY

## 2024-02-20 RX ORDER — ROSUVASTATIN CALCIUM 5 MG/1
5 TABLET, COATED ORAL DAILY
COMMUNITY

## 2024-02-20 RX ORDER — AMLODIPINE BESYLATE 5 MG/1
5 TABLET ORAL DAILY
COMMUNITY

## 2024-02-20 RX ORDER — LISINOPRIL 2.5 MG/1
2.5 TABLET ORAL DAILY
COMMUNITY

## 2024-02-28 ENCOUNTER — INITIAL CONSULT (OUTPATIENT)
Dept: VASCULAR SURGERY | Facility: CLINIC | Age: 69
End: 2024-02-28
Payer: OTHER GOVERNMENT

## 2024-02-28 VITALS — BODY MASS INDEX: 27.97 KG/M2 | HEIGHT: 62 IN | WEIGHT: 152 LBS

## 2024-02-28 DIAGNOSIS — I73.9 PVD (PERIPHERAL VASCULAR DISEASE): Primary | ICD-10-CM

## 2024-02-28 DIAGNOSIS — I73.9 PERIPHERAL VASCULAR DISEASE, UNSPECIFIED: ICD-10-CM

## 2024-02-28 PROCEDURE — 99203 OFFICE O/P NEW LOW 30 MIN: CPT | Mod: S$PBB,,, | Performed by: SURGERY

## 2024-02-28 PROCEDURE — 99213 OFFICE O/P EST LOW 20 MIN: CPT | Mod: PBBFAC | Performed by: SURGERY

## 2024-02-28 RX ORDER — HYDROCODONE BITARTRATE AND ACETAMINOPHEN 7.5; 325 MG/1; MG/1
1 TABLET ORAL EVERY 6 HOURS PRN
Qty: 20 TABLET | Refills: 0 | Status: SHIPPED | OUTPATIENT
Start: 2024-02-28

## 2024-02-28 NOTE — PROGRESS NOTES
"Subjective:       Patient ID: Jey Kaye is a 69 y.o. male.    Chief Complaint: Peripheral Vascular Disease  New patient.  Peripheral vascular disease nonhealing wounds 2nd digit left foot jogging in his changes.  States I performed bypass on his right leg while back said multiple toes amputated on the right foot was all well healed was at this time.  Noninvasive studies or abnormal bilaterally.  Off left appears to be distal SFA and popliteal disease toe brachial index is 0.3 ft or.  Pulses are not palpable.  Dry gangrenous changes digit 2nd left foot plain x-ray does not show osteomyelitis been followed in the [facility] several mentions of appointments with Dr. Rendon's but patient can not recall if he is seen him feel he needs CT angio for limb threatening ischemia left foot  family history is not on file.  Past Medical History:   Diagnosis Date    Diabetes mellitus, type 2     PVD (peripheral vascular disease)       Past Surgical History:   Procedure Laterality Date    CREATION OF FEMORAL-FEMORAL ARTERY BYPASS WITH GRAFT  03/30/2020    TOE AMPUTATION         reports that he has been smoking. He has never used smokeless tobacco. He reports current alcohol use of about 4.0 standard drinks of alcohol per week. He reports that he does not use drugs.   HPI  Review of Systems      Objective:      Ht 5' 2" (1.575 m)   Wt 68.9 kg (152 lb)   BMI 27.80 kg/m²    Physical Exam  Constitutional:       Appearance: Normal appearance.   Cardiovascular:      Rate and Rhythm: Normal rate.   Pulmonary:      Effort: Pulmonary effort is normal.   Abdominal:      Palpations: Abdomen is soft.      Comments: Midline surgical scar well healed   Musculoskeletal:      Comments: All toes amputated right foot with healed metatarsal surgical scar     Dry gangrenous changes 2nd digit left foot without cellulitis or drainage   Skin:     Capillary Refill: Capillary refill takes less than 2 seconds.   Neurological:      Mental " Status: He is alert.   Psychiatric:         Mood and Affect: Mood normal.         Behavior: Behavior normal.         Thought Content: Thought content normal.         Judgment: Judgment normal.           Assessment:       1. PVD (peripheral vascular disease)        Plan:       CT angio follow-up after above continue current medications, we will give him some Norco per request discomfort and pain

## 2024-03-11 ENCOUNTER — OUTSIDE PLACE OF SERVICE (OUTPATIENT)
Dept: VASCULAR SURGERY | Facility: CLINIC | Age: 69
End: 2024-03-11
Payer: OTHER GOVERNMENT

## 2024-03-11 PROCEDURE — 99222 1ST HOSP IP/OBS MODERATE 55: CPT | Mod: ,,, | Performed by: NURSE PRACTITIONER
